# Patient Record
Sex: FEMALE | Race: WHITE | NOT HISPANIC OR LATINO | Employment: UNEMPLOYED | URBAN - METROPOLITAN AREA
[De-identification: names, ages, dates, MRNs, and addresses within clinical notes are randomized per-mention and may not be internally consistent; named-entity substitution may affect disease eponyms.]

---

## 2024-02-26 ENCOUNTER — HOSPITAL ENCOUNTER (EMERGENCY)
Facility: HOSPITAL | Age: 59
Discharge: HOME | End: 2024-02-27
Attending: STUDENT IN AN ORGANIZED HEALTH CARE EDUCATION/TRAINING PROGRAM | Admitting: STUDENT IN AN ORGANIZED HEALTH CARE EDUCATION/TRAINING PROGRAM
Payer: COMMERCIAL

## 2024-02-26 ENCOUNTER — APPOINTMENT (OUTPATIENT)
Dept: GENERAL RADIOLOGY | Facility: HOSPITAL | Age: 59
End: 2024-02-26
Payer: COMMERCIAL

## 2024-02-26 DIAGNOSIS — J18.9 MULTIFOCAL PNEUMONIA: Primary | ICD-10-CM

## 2024-02-26 DIAGNOSIS — R05.1 ACUTE COUGH: ICD-10-CM

## 2024-02-26 LAB
ALBUMIN SERPL-MCNC: 3.2 G/DL (ref 3.5–5.2)
ALBUMIN/GLOB SERPL: 1 G/DL
ALP SERPL-CCNC: 133 U/L (ref 39–117)
ALT SERPL W P-5'-P-CCNC: 34 U/L (ref 1–33)
ANION GAP SERPL CALCULATED.3IONS-SCNC: 11.4 MMOL/L (ref 5–15)
AST SERPL-CCNC: 47 U/L (ref 1–32)
BASOPHILS # BLD AUTO: 0.01 10*3/MM3 (ref 0–0.2)
BASOPHILS NFR BLD AUTO: 0.1 % (ref 0–1.5)
BILIRUB SERPL-MCNC: 0.3 MG/DL (ref 0–1.2)
BUN SERPL-MCNC: 11 MG/DL (ref 6–20)
BUN/CREAT SERPL: 11 (ref 7–25)
CALCIUM SPEC-SCNC: 8.8 MG/DL (ref 8.6–10.5)
CHLORIDE SERPL-SCNC: 92 MMOL/L (ref 98–107)
CO2 SERPL-SCNC: 27.6 MMOL/L (ref 22–29)
CREAT SERPL-MCNC: 1 MG/DL (ref 0.57–1)
DEPRECATED RDW RBC AUTO: 46.5 FL (ref 37–54)
EGFRCR SERPLBLD CKD-EPI 2021: 65.8 ML/MIN/1.73
EOSINOPHIL # BLD AUTO: 0.04 10*3/MM3 (ref 0–0.4)
EOSINOPHIL NFR BLD AUTO: 0.3 % (ref 0.3–6.2)
ERYTHROCYTE [DISTWIDTH] IN BLOOD BY AUTOMATED COUNT: 14.2 % (ref 12.3–15.4)
GLOBULIN UR ELPH-MCNC: 3.1 GM/DL
GLUCOSE SERPL-MCNC: 83 MG/DL (ref 65–99)
HCT VFR BLD AUTO: 30.6 % (ref 34–46.6)
HGB BLD-MCNC: 10 G/DL (ref 12–15.9)
IMM GRANULOCYTES # BLD AUTO: 0.3 10*3/MM3 (ref 0–0.05)
IMM GRANULOCYTES NFR BLD AUTO: 1.9 % (ref 0–0.5)
LYMPHOCYTES # BLD AUTO: 2.63 10*3/MM3 (ref 0.7–3.1)
LYMPHOCYTES NFR BLD AUTO: 16.9 % (ref 19.6–45.3)
MAGNESIUM SERPL-MCNC: 2 MG/DL (ref 1.6–2.6)
MCH RBC QN AUTO: 28.3 PG (ref 26.6–33)
MCHC RBC AUTO-ENTMCNC: 32.7 G/DL (ref 31.5–35.7)
MCV RBC AUTO: 86.7 FL (ref 79–97)
MONOCYTES # BLD AUTO: 0.82 10*3/MM3 (ref 0.1–0.9)
MONOCYTES NFR BLD AUTO: 5.3 % (ref 5–12)
NEUTROPHILS NFR BLD AUTO: 11.79 10*3/MM3 (ref 1.7–7)
NEUTROPHILS NFR BLD AUTO: 75.5 % (ref 42.7–76)
PLATELET # BLD AUTO: 407 10*3/MM3 (ref 140–450)
PMV BLD AUTO: 8.4 FL (ref 6–12)
POTASSIUM SERPL-SCNC: 2.8 MMOL/L (ref 3.5–5.2)
PROT SERPL-MCNC: 6.3 G/DL (ref 6–8.5)
RBC # BLD AUTO: 3.53 10*6/MM3 (ref 3.77–5.28)
SODIUM SERPL-SCNC: 131 MMOL/L (ref 136–145)
WBC NRBC COR # BLD AUTO: 15.59 10*3/MM3 (ref 3.4–10.8)

## 2024-02-26 PROCEDURE — 71046 X-RAY EXAM CHEST 2 VIEWS: CPT

## 2024-02-26 PROCEDURE — 25010000002 PIPERACILLIN SOD-TAZOBACTAM PER 1 G

## 2024-02-26 PROCEDURE — 85025 COMPLETE CBC W/AUTO DIFF WBC: CPT

## 2024-02-26 PROCEDURE — 83735 ASSAY OF MAGNESIUM: CPT | Performed by: STUDENT IN AN ORGANIZED HEALTH CARE EDUCATION/TRAINING PROGRAM

## 2024-02-26 PROCEDURE — 80053 COMPREHEN METABOLIC PANEL: CPT

## 2024-02-26 PROCEDURE — 96365 THER/PROPH/DIAG IV INF INIT: CPT

## 2024-02-26 PROCEDURE — 96375 TX/PRO/DX INJ NEW DRUG ADDON: CPT

## 2024-02-26 PROCEDURE — 99283 EMERGENCY DEPT VISIT LOW MDM: CPT

## 2024-02-26 PROCEDURE — 25010000002 METHYLPREDNISOLONE PER 125 MG

## 2024-02-26 RX ORDER — POTASSIUM CHLORIDE 750 MG/1
40 TABLET, FILM COATED, EXTENDED RELEASE ORAL ONCE
Status: COMPLETED | OUTPATIENT
Start: 2024-02-26 | End: 2024-02-27

## 2024-02-26 RX ORDER — DEXTROAMPHETAMINE SACCHARATE, AMPHETAMINE ASPARTATE, DEXTROAMPHETAMINE SULFATE AND AMPHETAMINE SULFATE 5; 5; 5; 5 MG/1; MG/1; MG/1; MG/1
20 TABLET ORAL DAILY
COMMUNITY

## 2024-02-26 RX ORDER — METHYLPREDNISOLONE SODIUM SUCCINATE 125 MG/2ML
125 INJECTION, POWDER, LYOPHILIZED, FOR SOLUTION INTRAMUSCULAR; INTRAVENOUS ONCE
Status: COMPLETED | OUTPATIENT
Start: 2024-02-26 | End: 2024-02-26

## 2024-02-26 RX ORDER — IPRATROPIUM BROMIDE AND ALBUTEROL SULFATE 2.5; .5 MG/3ML; MG/3ML
3 SOLUTION RESPIRATORY (INHALATION) ONCE
Status: COMPLETED | OUTPATIENT
Start: 2024-02-26 | End: 2024-02-26

## 2024-02-26 RX ORDER — BUPROPION HYDROCHLORIDE 150 MG/1
150 TABLET ORAL DAILY
COMMUNITY

## 2024-02-26 RX ORDER — SODIUM CHLORIDE 0.9 % (FLUSH) 0.9 %
10 SYRINGE (ML) INJECTION AS NEEDED
Status: DISCONTINUED | OUTPATIENT
Start: 2024-02-26 | End: 2024-02-27 | Stop reason: HOSPADM

## 2024-02-26 RX ORDER — LEVOTHYROXINE SODIUM 300 UG/1
300 TABLET ORAL DAILY
COMMUNITY

## 2024-02-26 RX ORDER — LOSARTAN POTASSIUM 100 MG/1
100 TABLET ORAL DAILY
COMMUNITY

## 2024-02-26 RX ADMIN — IPRATROPIUM BROMIDE AND ALBUTEROL SULFATE 3 ML: 2.5; .5 SOLUTION RESPIRATORY (INHALATION) at 22:56

## 2024-02-26 RX ADMIN — METHYLPREDNISOLONE SODIUM SUCCINATE 125 MG: 125 INJECTION, POWDER, FOR SOLUTION INTRAMUSCULAR; INTRAVENOUS at 23:01

## 2024-02-26 RX ADMIN — PIPERACILLIN AND TAZOBACTAM 3.38 G: 3; .375 INJECTION, POWDER, FOR SOLUTION INTRAVENOUS at 23:10

## 2024-02-27 VITALS
WEIGHT: 175 LBS | RESPIRATION RATE: 20 BRPM | TEMPERATURE: 98.6 F | BODY MASS INDEX: 28.12 KG/M2 | OXYGEN SATURATION: 94 % | HEART RATE: 73 BPM | DIASTOLIC BLOOD PRESSURE: 61 MMHG | HEIGHT: 66 IN | SYSTOLIC BLOOD PRESSURE: 101 MMHG

## 2024-02-27 RX ORDER — ALBUTEROL SULFATE 90 UG/1
2 AEROSOL, METERED RESPIRATORY (INHALATION) EVERY 4 HOURS PRN
Qty: 8 G | Refills: 0 | Status: SHIPPED | OUTPATIENT
Start: 2024-02-27

## 2024-02-27 RX ORDER — ONDANSETRON 4 MG/1
4 TABLET, ORALLY DISINTEGRATING ORAL EVERY 6 HOURS PRN
Qty: 20 TABLET | Refills: 0 | Status: SHIPPED | OUTPATIENT
Start: 2024-02-27

## 2024-02-27 RX ORDER — FLUCONAZOLE 150 MG/1
150 TABLET ORAL ONCE
Qty: 1 TABLET | Refills: 0 | Status: SHIPPED | OUTPATIENT
Start: 2024-02-27 | End: 2024-02-27

## 2024-02-27 RX ORDER — AMOXICILLIN AND CLAVULANATE POTASSIUM 875; 125 MG/1; MG/1
1 TABLET, FILM COATED ORAL 2 TIMES DAILY
Qty: 14 TABLET | Refills: 0 | Status: SHIPPED | OUTPATIENT
Start: 2024-02-27 | End: 2024-03-05

## 2024-02-27 RX ORDER — POTASSIUM CHLORIDE 750 MG/1
20 TABLET, FILM COATED, EXTENDED RELEASE ORAL DAILY
Qty: 6 TABLET | Refills: 0 | Status: SHIPPED | OUTPATIENT
Start: 2024-02-27 | End: 2024-03-01

## 2024-02-27 RX ADMIN — POTASSIUM CHLORIDE 40 MEQ: 750 TABLET, EXTENDED RELEASE ORAL at 00:10

## 2024-02-27 NOTE — FSED PROVIDER NOTE
Subjective   History of Present Illness  Patient is a 58-year-old female with a history of HTN, hypothyroidism, depression, prediabetes who presents to the emergency department for cough x 1 week.  Associated congestion, fatigue, fever, very poor oral intake, lightheadedness reports fever of 100.1F.  Patient was seen by PCP on Friday, diagnosed with bronchitis, and prescribed azithromycin.  Reports is not helping.  Current everyday smoker.  No history of asthma, COPD, CHF.        Review of Systems   Constitutional:  Positive for appetite change, fatigue and fever. Negative for chills and diaphoresis.   HENT:  Positive for congestion, postnasal drip and rhinorrhea. Negative for sinus pressure, sinus pain, sore throat and trouble swallowing.    Eyes:  Negative for pain and redness.   Respiratory:  Positive for cough. Negative for shortness of breath.    Cardiovascular:  Negative for chest pain.   Gastrointestinal:  Negative for abdominal pain, constipation, diarrhea, nausea and vomiting.   Genitourinary:  Negative for difficulty urinating and dysuria.   Skin:  Negative for color change, pallor, rash and wound.   Neurological:  Positive for weakness and light-headedness. Negative for tremors, seizures, syncope, speech difficulty and headaches.       Past Medical History:   Diagnosis Date    Depression     Disease of thyroid gland     Hypertension        No Known Allergies    Past Surgical History:   Procedure Laterality Date    GASTRECTOMY      HYSTERECTOMY         History reviewed. No pertinent family history.    Social History     Socioeconomic History    Marital status:            Objective   Physical Exam  Constitutional:       General: She is not in acute distress.     Appearance: She is normal weight. She is not ill-appearing, toxic-appearing or diaphoretic.   HENT:      Right Ear: Ear canal and external ear normal.      Left Ear: Tympanic membrane, ear canal and external ear normal.      Ears:       Comments: Right TM mildly erythematous.  Eyes:      Extraocular Movements: Extraocular movements intact.      Conjunctiva/sclera: Conjunctivae normal.      Pupils: Pupils are equal, round, and reactive to light.   Cardiovascular:      Rate and Rhythm: Normal rate and regular rhythm.   Pulmonary:      Effort: Pulmonary effort is normal. No respiratory distress.      Breath sounds: Normal breath sounds.      Comments: Occasional cough.  Wheezing crackles bilateral lower lung bases, left worse than right.  No respiratory distress.  Skin:     General: Skin is warm and dry.   Neurological:      General: No focal deficit present.      Mental Status: She is alert and oriented to person, place, and time.   Psychiatric:         Mood and Affect: Mood normal.         Behavior: Behavior normal.         Procedures           ED Course  ED Course as of 02/27/24 2156   Mon Feb 26, 2024   2255 Chest x-ray PA and lateral:  IMPRESSION:  1. There is multifocal patchy and nodular airspace consolidation  involving the lingular segment of the left upper lobe and the right  middle lobe and both lung bases compatible with multifocal pneumonia. A  CT of the chest could be obtained for a more comprehensive assessment. I  recommend follow-up chest x-ray to resolution. Results were communicated  to Dr. Bella from [facility] by telephone on 2/26/2024 at  10:40 p.m [AS]   2324 WBC 15.59.  And potassium 2.8.  Patient received IV antibiotics in the ED.  Patient has no O2 requirement.  She has 1 of 4 SIRS criteria.  Patient has had decreased p.o. intake which is likely contributing to hypokalemia.  Replacement was conducted in the ED. [AN]   Tue Feb 27, 2024   0028 Patient feels significantly improved after medication in the ED.  Walking SpO2 maintained 92% and greater.  Will add Augmentin on top of her azithromycin.  She is also been prescribed an albuterol inhaler, oral potassium x 3 days and as needed Zofran.  Strict return  precautions were given.  She will see her primary care provider in 2 days.  If she has worsening symptoms before then, or is at all otherwise concerned she will return to the ED sooner.    Electronically signed by Roberto Bella DO, 02/27/24, 12:29 AM EST.   [AN]      ED Course User Index  [AN] Roberto Bella DO  [AS] Deborah Humphries, CRISTA                                           Medical Decision Making  Patient is a 58-year-old female who presents with cough.  Overall appears mildly ill, but in no acute distress, nontoxic.  Vital signs are WNL.  There is some wheezing and crackles in the lower lung bases.  Nebulizer and lab work ordered.    My differential diagnosis for cough includes but is not limited to:  Upper respiratory infection, bronchitis, pneumonia, COPD exacerbation, cough variant asthma, cardiac asthma, coronary artery disease, congestive heart failure, bacterial, viral or lung infections, lung cancer, aspiration pneumonitis, aspiration of foreign body and Covid -19           Problems Addressed:  Acute cough: complicated acute illness or injury  Multifocal pneumonia: complicated acute illness or injury    Amount and/or Complexity of Data Reviewed  Labs: ordered.  Radiology: ordered.    Risk  Prescription drug management.        Final diagnoses:   Acute cough   Multifocal pneumonia       ED Disposition  ED Disposition       ED Disposition   Discharge    Condition   Stable    Comment   --               Feli Cottrell MD  [address]  [place] [address]  [phone]    Schedule an appointment as soon as possible for a visit   If symptoms worsen    Feli Cottrell MD  [address]  [place] [address]  [phone]    Schedule an appointment as soon as possible for a visit in 2 days           Medication List        New Prescriptions      albuterol sulfate  (90 Base) MCG/ACT inhaler  Commonly known as: PROVENTIL HFA;VENTOLIN HFA;PROAIR HFA  Inhale 2 puffs Every 4 (Four)  Hours As Needed for Wheezing.     amoxicillin-clavulanate 875-125 MG per tablet  Commonly known as: AUGMENTIN  Take 1 tablet by mouth 2 (Two) Times a Day for 7 days.     fluconazole 150 MG tablet  Commonly known as: DIFLUCAN  Take 1 tablet by mouth 1 (One) Time for 1 dose.     ondansetron ODT 4 MG disintegrating tablet  Commonly known as: ZOFRAN-ODT  Place 1 tablet on the tongue Every 6 (Six) Hours As Needed for Nausea or Vomiting.     potassium chloride 10 MEQ CR tablet  Take 2 tablets by mouth Daily for 3 days.               Where to Get Your Medications        These medications were sent to [id number] - [place], [place] - [address] [address] AT [facility] [address] - [phone] [facility] [phone]   [id number] [place] [id number]      Phone: [phone]   albuterol sulfate  (90 Base) MCG/ACT inhaler  amoxicillin-clavulanate 875-125 MG per tablet  fluconazole 150 MG tablet  ondansetron ODT 4 MG disintegrating tablet  potassium chloride 10 MEQ CR tablet

## 2024-02-27 NOTE — DISCHARGE INSTRUCTIONS
Follow-up with your primary care provider for recheck in 2 days.  If your symptoms worsen or new symptoms develop before then please return to ED sooner.  Otherwise continue taking your azithromycin and Augmentin.  You have been prescribed potassium because your potassium level was low in the emergency department.  We gave you oral replacement while in the ER but you should continue taking it for few days.  Zofran has been prescribed to help you with nausea.

## 2024-02-27 NOTE — ED NOTES
Pt was seen Friday and given antibx, feel no better, has dry cough and feels like there is fluid in her lungs, feels exhausted

## 2024-08-29 ENCOUNTER — APPOINTMENT (OUTPATIENT)
Dept: GENERAL RADIOLOGY | Facility: HOSPITAL | Age: 59
End: 2024-08-29
Payer: COMMERCIAL

## 2024-08-29 ENCOUNTER — APPOINTMENT (OUTPATIENT)
Dept: CT IMAGING | Facility: HOSPITAL | Age: 59
End: 2024-08-29
Payer: COMMERCIAL

## 2024-08-29 ENCOUNTER — HOSPITAL ENCOUNTER (EMERGENCY)
Facility: HOSPITAL | Age: 59
Discharge: HOME | End: 2024-08-30
Attending: EMERGENCY MEDICINE
Payer: COMMERCIAL

## 2024-08-29 VITALS
BODY MASS INDEX: 27.47 KG/M2 | DIASTOLIC BLOOD PRESSURE: 87 MMHG | TEMPERATURE: 97.1 F | WEIGHT: 175 LBS | SYSTOLIC BLOOD PRESSURE: 148 MMHG | RESPIRATION RATE: 18 BRPM | OXYGEN SATURATION: 97 % | HEART RATE: 88 BPM | HEIGHT: 67 IN

## 2024-08-29 DIAGNOSIS — R51.9 ACUTE NONINTRACTABLE HEADACHE, UNSPECIFIED HEADACHE TYPE: Primary | ICD-10-CM

## 2024-08-29 DIAGNOSIS — I10 HYPERTENSION NOT AT GOAL: ICD-10-CM

## 2024-08-29 DIAGNOSIS — R00.2 PALPITATIONS: ICD-10-CM

## 2024-08-29 LAB
ALBUMIN SERPL-MCNC: 4.3 G/DL (ref 3.5–5.2)
ALBUMIN/GLOB SERPL: 1.7 G/DL
ALP SERPL-CCNC: 104 U/L (ref 39–117)
ALT SERPL W P-5'-P-CCNC: 20 U/L (ref 1–33)
ANION GAP SERPL CALCULATED.3IONS-SCNC: 9.7 MMOL/L (ref 5–15)
APTT PPP: 25.3 SECONDS (ref 22.7–35.4)
AST SERPL-CCNC: 18 U/L (ref 1–32)
BASOPHILS # BLD AUTO: 0.02 10*3/MM3 (ref 0–0.2)
BASOPHILS NFR BLD AUTO: 0.2 % (ref 0–1.5)
BILIRUB SERPL-MCNC: <0.2 MG/DL (ref 0–1.2)
BUN SERPL-MCNC: 20 MG/DL (ref 6–20)
BUN/CREAT SERPL: 24.1 (ref 7–25)
CALCIUM SPEC-SCNC: 8.8 MG/DL (ref 8.6–10.5)
CHLORIDE SERPL-SCNC: 108 MMOL/L (ref 98–107)
CO2 SERPL-SCNC: 20.3 MMOL/L (ref 22–29)
CREAT SERPL-MCNC: 0.83 MG/DL (ref 0.57–1)
DEPRECATED RDW RBC AUTO: 51.6 FL (ref 37–54)
EGFRCR SERPLBLD CKD-EPI 2021: 81.8 ML/MIN/1.73
EOSINOPHIL # BLD AUTO: 0.01 10*3/MM3 (ref 0–0.4)
EOSINOPHIL NFR BLD AUTO: 0.1 % (ref 0.3–6.2)
ERYTHROCYTE [DISTWIDTH] IN BLOOD BY AUTOMATED COUNT: 16.3 % (ref 12.3–15.4)
GLOBULIN UR ELPH-MCNC: 2.6 GM/DL
GLUCOSE SERPL-MCNC: 152 MG/DL (ref 65–99)
HCT VFR BLD AUTO: 31.9 % (ref 34–46.6)
HGB BLD-MCNC: 10.7 G/DL (ref 12–15.9)
HOLD SPECIMEN: NORMAL
HOLD SPECIMEN: NORMAL
IMM GRANULOCYTES # BLD AUTO: 0.05 10*3/MM3 (ref 0–0.05)
IMM GRANULOCYTES NFR BLD AUTO: 0.5 % (ref 0–0.5)
INR PPP: 0.91 (ref 0.9–1.1)
LYMPHOCYTES # BLD AUTO: 2.1 10*3/MM3 (ref 0.7–3.1)
LYMPHOCYTES NFR BLD AUTO: 22.2 % (ref 19.6–45.3)
MAGNESIUM SERPL-MCNC: 2.2 MG/DL (ref 1.6–2.6)
MCH RBC QN AUTO: 29.4 PG (ref 26.6–33)
MCHC RBC AUTO-ENTMCNC: 33.5 G/DL (ref 31.5–35.7)
MCV RBC AUTO: 87.6 FL (ref 79–97)
MONOCYTES # BLD AUTO: 0.48 10*3/MM3 (ref 0.1–0.9)
MONOCYTES NFR BLD AUTO: 5.1 % (ref 5–12)
NEUTROPHILS NFR BLD AUTO: 6.81 10*3/MM3 (ref 1.7–7)
NEUTROPHILS NFR BLD AUTO: 71.9 % (ref 42.7–76)
NRBC BLD AUTO-RTO: 0 /100 WBC (ref 0–0.2)
PLATELET # BLD AUTO: 231 10*3/MM3 (ref 140–450)
PMV BLD AUTO: 9 FL (ref 6–12)
POTASSIUM SERPL-SCNC: 3.2 MMOL/L (ref 3.5–5.2)
PROT SERPL-MCNC: 6.9 G/DL (ref 6–8.5)
PROTHROMBIN TIME: 12.4 SECONDS (ref 11.7–14.2)
RBC # BLD AUTO: 3.64 10*6/MM3 (ref 3.77–5.28)
SODIUM SERPL-SCNC: 138 MMOL/L (ref 136–145)
T4 FREE SERPL-MCNC: 1.3 NG/DL (ref 0.92–1.68)
TROPONIN T SERPL HS-MCNC: 10 NG/L
TSH SERPL DL<=0.05 MIU/L-ACNC: 9.16 UIU/ML (ref 0.27–4.2)
WBC NRBC COR # BLD AUTO: 9.47 10*3/MM3 (ref 3.4–10.8)
WHOLE BLOOD HOLD COAG: NORMAL
WHOLE BLOOD HOLD SPECIMEN: NORMAL

## 2024-08-29 PROCEDURE — 85610 PROTHROMBIN TIME: CPT | Performed by: PHYSICIAN ASSISTANT

## 2024-08-29 PROCEDURE — 36415 COLL VENOUS BLD VENIPUNCTURE: CPT

## 2024-08-29 PROCEDURE — 93005 ELECTROCARDIOGRAM TRACING: CPT | Performed by: EMERGENCY MEDICINE

## 2024-08-29 PROCEDURE — 83735 ASSAY OF MAGNESIUM: CPT | Performed by: PHYSICIAN ASSISTANT

## 2024-08-29 PROCEDURE — 84439 ASSAY OF FREE THYROXINE: CPT | Performed by: PHYSICIAN ASSISTANT

## 2024-08-29 PROCEDURE — 99284 EMERGENCY DEPT VISIT MOD MDM: CPT

## 2024-08-29 PROCEDURE — 93010 ELECTROCARDIOGRAM REPORT: CPT | Performed by: INTERNAL MEDICINE

## 2024-08-29 PROCEDURE — 70450 CT HEAD/BRAIN W/O DYE: CPT

## 2024-08-29 PROCEDURE — 0202U NFCT DS 22 TRGT SARS-COV-2: CPT | Performed by: PHYSICIAN ASSISTANT

## 2024-08-29 PROCEDURE — 93005 ELECTROCARDIOGRAM TRACING: CPT

## 2024-08-29 PROCEDURE — 84484 ASSAY OF TROPONIN QUANT: CPT | Performed by: EMERGENCY MEDICINE

## 2024-08-29 PROCEDURE — 85730 THROMBOPLASTIN TIME PARTIAL: CPT | Performed by: PHYSICIAN ASSISTANT

## 2024-08-29 PROCEDURE — 71045 X-RAY EXAM CHEST 1 VIEW: CPT

## 2024-08-29 PROCEDURE — 80050 GENERAL HEALTH PANEL: CPT | Performed by: EMERGENCY MEDICINE

## 2024-08-29 RX ORDER — SODIUM CHLORIDE 0.9 % (FLUSH) 0.9 %
10 SYRINGE (ML) INJECTION AS NEEDED
Status: DISCONTINUED | OUTPATIENT
Start: 2024-08-29 | End: 2024-08-30 | Stop reason: HOSPADM

## 2024-08-29 RX ORDER — ASPIRIN 325 MG
325 TABLET ORAL ONCE
Status: DISCONTINUED | OUTPATIENT
Start: 2024-08-29 | End: 2024-08-30 | Stop reason: HOSPADM

## 2024-08-30 LAB
B PARAPERT DNA SPEC QL NAA+PROBE: NOT DETECTED
B PERT DNA SPEC QL NAA+PROBE: NOT DETECTED
C PNEUM DNA NPH QL NAA+NON-PROBE: NOT DETECTED
FLUAV SUBTYP SPEC NAA+PROBE: NOT DETECTED
FLUBV RNA ISLT QL NAA+PROBE: NOT DETECTED
GEN 5 2HR TROPONIN T REFLEX: 12 NG/L
HADV DNA SPEC NAA+PROBE: NOT DETECTED
HCOV 229E RNA SPEC QL NAA+PROBE: NOT DETECTED
HCOV HKU1 RNA SPEC QL NAA+PROBE: NOT DETECTED
HCOV NL63 RNA SPEC QL NAA+PROBE: NOT DETECTED
HCOV OC43 RNA SPEC QL NAA+PROBE: NOT DETECTED
HMPV RNA NPH QL NAA+NON-PROBE: NOT DETECTED
HPIV1 RNA ISLT QL NAA+PROBE: NOT DETECTED
HPIV2 RNA SPEC QL NAA+PROBE: NOT DETECTED
HPIV3 RNA NPH QL NAA+PROBE: NOT DETECTED
HPIV4 P GENE NPH QL NAA+PROBE: NOT DETECTED
M PNEUMO IGG SER IA-ACNC: NOT DETECTED
QT INTERVAL: 362 MS
QTC INTERVAL: 460 MS
RHINOVIRUS RNA SPEC NAA+PROBE: NOT DETECTED
RSV RNA NPH QL NAA+NON-PROBE: NOT DETECTED
SARS-COV-2 RNA NPH QL NAA+NON-PROBE: NOT DETECTED
TROPONIN T DELTA: 2 NG/L

## 2024-08-30 PROCEDURE — 84484 ASSAY OF TROPONIN QUANT: CPT | Performed by: EMERGENCY MEDICINE

## 2024-08-30 RX ORDER — ACETAMINOPHEN 500 MG
1000 TABLET ORAL ONCE
Status: COMPLETED | OUTPATIENT
Start: 2024-08-30 | End: 2024-08-30

## 2024-08-30 RX ADMIN — ACETAMINOPHEN 1000 MG: 500 TABLET ORAL at 00:19

## 2024-08-30 NOTE — ED PROVIDER NOTES
MD ATTESTATION NOTE    SHARED VISIT: This visit was performed by BOTH a physician and an APC. The substantive portion of the medical decision making was performed by this attesting physician who made or approved the management plan and takes responsibility for patient management. All studies documented in the APC note (if performed) were independently interpreted by me.    The JOEY and I have discussed this patient's history, physical exam, MDM, and treatment plan.  I have reviewed the documentation and personally had a face to face interaction with the patient. The attached note describes my personal findings.      Rachael Billings is a 59 y.o. female who presents to the ED c/o acute headache and feeling like her blood pressure been running high.  States she been having headaches and palpitations for the past couple days.  States that this evening around 9 or 10 PM she developed a frontal headache which was more severe than the prior ones.  Is not the worst headache of her life did come on gradually.  Patient checked her blood pressure it was in the 180s systolic.  Patient denies any chest pain no shortness of breath no abdominal pain no nausea vomiting.  The headache has improved since coming to the ED.  Also states her blood pressure has been improving.    On exam:  GENERAL: not distressed  HENT: nares patent  EYES: no scleral icterus  CV: regular rhythm, regular rate  RESPIRATORY: normal effort  ABDOMEN: soft  MUSCULOSKELETAL: no deformity  NEURO: alert, moves all extremities, follows commands  SKIN: warm, dry    Labs  Recent Results (from the past 24 hour(s))   ECG 12 Lead ED Triage Standing Order; Chest Pain    Collection Time: 08/29/24 10:22 PM   Result Value Ref Range    QT Interval 362 ms    QTC Interval 460 ms   Comprehensive Metabolic Panel    Collection Time: 08/29/24 10:44 PM    Specimen: Blood   Result Value Ref Range    Glucose 152 (H) 65 - 99 mg/dL    BUN 20 6 - 20 mg/dL    Creatinine 0.83 0.57 - 1.00  mg/dL    Sodium 138 136 - 145 mmol/L    Potassium 3.2 (L) 3.5 - 5.2 mmol/L    Chloride 108 (H) 98 - 107 mmol/L    CO2 20.3 (L) 22.0 - 29.0 mmol/L    Calcium 8.8 8.6 - 10.5 mg/dL    Total Protein 6.9 6.0 - 8.5 g/dL    Albumin 4.3 3.5 - 5.2 g/dL    ALT (SGPT) 20 1 - 33 U/L    AST (SGOT) 18 1 - 32 U/L    Alkaline Phosphatase 104 39 - 117 U/L    Total Bilirubin <0.2 0.0 - 1.2 mg/dL    Globulin 2.6 gm/dL    A/G Ratio 1.7 g/dL    BUN/Creatinine Ratio 24.1 7.0 - 25.0    Anion Gap 9.7 5.0 - 15.0 mmol/L    eGFR 81.8 >60.0 mL/min/1.73   High Sensitivity Troponin T    Collection Time: 08/29/24 10:44 PM    Specimen: Blood   Result Value Ref Range    HS Troponin T 10 <14 ng/L   Green Top (Gel)    Collection Time: 08/29/24 10:44 PM   Result Value Ref Range    Extra Tube Hold for add-ons.    Lavender Top    Collection Time: 08/29/24 10:44 PM   Result Value Ref Range    Extra Tube hold for add-on    Gold Top - SST    Collection Time: 08/29/24 10:44 PM   Result Value Ref Range    Extra Tube Hold for add-ons.    Light Blue Top    Collection Time: 08/29/24 10:44 PM   Result Value Ref Range    Extra Tube Hold for add-ons.    CBC Auto Differential    Collection Time: 08/29/24 10:44 PM    Specimen: Blood   Result Value Ref Range    WBC 9.47 3.40 - 10.80 10*3/mm3    RBC 3.64 (L) 3.77 - 5.28 10*6/mm3    Hemoglobin 10.7 (L) 12.0 - 15.9 g/dL    Hematocrit 31.9 (L) 34.0 - 46.6 %    MCV 87.6 79.0 - 97.0 fL    MCH 29.4 26.6 - 33.0 pg    MCHC 33.5 31.5 - 35.7 g/dL    RDW 16.3 (H) 12.3 - 15.4 %    RDW-SD 51.6 37.0 - 54.0 fl    MPV 9.0 6.0 - 12.0 fL    Platelets 231 140 - 450 10*3/mm3    Neutrophil % 71.9 42.7 - 76.0 %    Lymphocyte % 22.2 19.6 - 45.3 %    Monocyte % 5.1 5.0 - 12.0 %    Eosinophil % 0.1 (L) 0.3 - 6.2 %    Basophil % 0.2 0.0 - 1.5 %    Immature Grans % 0.5 0.0 - 0.5 %    Neutrophils, Absolute 6.81 1.70 - 7.00 10*3/mm3    Lymphocytes, Absolute 2.10 0.70 - 3.10 10*3/mm3    Monocytes, Absolute 0.48 0.10 - 0.90 10*3/mm3    Eosinophils,  Absolute 0.01 0.00 - 0.40 10*3/mm3    Basophils, Absolute 0.02 0.00 - 0.20 10*3/mm3    Immature Grans, Absolute 0.05 0.00 - 0.05 10*3/mm3    nRBC 0.0 0.0 - 0.2 /100 WBC   Magnesium    Collection Time: 08/29/24 10:44 PM    Specimen: Blood   Result Value Ref Range    Magnesium 2.2 1.6 - 2.6 mg/dL   Protime-INR    Collection Time: 08/29/24 10:44 PM    Specimen: Blood   Result Value Ref Range    Protime 12.4 11.7 - 14.2 Seconds    INR 0.91 0.90 - 1.10   aPTT    Collection Time: 08/29/24 10:44 PM    Specimen: Blood   Result Value Ref Range    PTT 25.3 22.7 - 35.4 seconds   TSH    Collection Time: 08/29/24 10:44 PM    Specimen: Blood   Result Value Ref Range    TSH 9.160 (H) 0.270 - 4.200 uIU/mL   T4, Free    Collection Time: 08/29/24 10:44 PM    Specimen: Blood   Result Value Ref Range    Free T4 1.30 0.92 - 1.68 ng/dL   Respiratory Panel PCR w/COVID-19(SARS-CoV-2) RAMÓN/SUKUMAR/ERICKA/PAD/COR/BRDAEN In-House, NP Swab in UTM/VTM, 2 HR TAT - Swab, Nasopharynx    Collection Time: 08/29/24 11:04 PM    Specimen: Nasopharynx; Swab   Result Value Ref Range    ADENOVIRUS, PCR Not Detected Not Detected    Coronavirus 229E Not Detected Not Detected    Coronavirus HKU1 Not Detected Not Detected    Coronavirus NL63 Not Detected Not Detected    Coronavirus OC43 Not Detected Not Detected    COVID19 Not Detected Not Detected - Ref. Range    Human Metapneumovirus Not Detected Not Detected    Human Rhinovirus/Enterovirus Not Detected Not Detected    Influenza A PCR Not Detected Not Detected    Influenza B PCR Not Detected Not Detected    Parainfluenza Virus 1 Not Detected Not Detected    Parainfluenza Virus 2 Not Detected Not Detected    Parainfluenza Virus 3 Not Detected Not Detected    Parainfluenza Virus 4 Not Detected Not Detected    RSV, PCR Not Detected Not Detected    Bordetella pertussis pcr Not Detected Not Detected    Bordetella parapertussis PCR Not Detected Not Detected    Chlamydophila pneumoniae PCR Not Detected Not Detected     Mycoplasma pneumo by PCR Not Detected Not Detected   High Sensitivity Troponin T 2Hr    Collection Time: 08/30/24 12:32 AM    Specimen: Blood   Result Value Ref Range    HS Troponin T 12 <14 ng/L    Troponin T Delta 2 >=-4 - <+4 ng/L       Radiology  CT Head Without Contrast    Result Date: 8/30/2024  Patient: CHERYL SINGH  Time Out: 00:03 Exam(s): CT HEAD Without Contrast EXAM:   CT Head Without Intravenous Contrast CLINICAL HISTORY:      Headache. TECHNIQUE:   Axial computed tomography images of the head brain without intravenous contrast.  CTDI is 56.23 mGy and DLP is 967.6 mGy-cm.  This CT exam was performed according to the principle of ALARA (As Low As Reasonably Achievable) by using one or more of the following dose reduction techniques: automated exposure control, adjustment of the mA and or kV according to patient size, and or use of iterative reconstruction technique. COMPARISON:   No relevant prior studies available. FINDINGS:   Brain:  No intracranial hemorrhage, mass-effect or midline shift.  No abnormal extra axial fluid.  No evidence of acute infarct.  Mild periventricular white matter hypodensities are most consistent with chronic microangiopathy.   Ventricles:  Unremarkable.  No ventriculomegaly.   Bones joints:  Unremarkable.  No acute fracture.   Soft tissues:  Unremarkable.   Sinuses:  Unremarkable as visualized.  No acute sinusitis.   Mastoid air cells:  Unremarkable as visualized.  No mastoid effusion. IMPRESSION:       No acute intracranial finding.     Electronically signed by Veena Mosley MD on 08-30-24 at 0003    XR Chest 1 View    Result Date: 8/29/2024  Patient: CHERYL SINGH  Time Out: 23:49 Exam(s): XR CXR 1 VIEW EXAM:   XR Chest, 1 View CLINICAL HISTORY:      Chest Pain. TECHNIQUE:   Frontal view of the chest. COMPARISON:   Chest radiograph 2 26 2024 FINDINGS:   Lungs:  Unremarkable.  No consolidation.   Pleural space:  Unremarkable.  No pneumothorax.   Heart:  Unremarkable.   No cardiomegaly.   Mediastinum:  Unremarkable.  Normal mediastinal contour.   Bones joints:  There are degenerative changes of the spine.  No acute fracture. IMPRESSION:       No acute findings in the chest.     Electronically signed by Veena Mosley MD on 08-29-24 at 2349     Medications given in the ED:  Medications   acetaminophen (TYLENOL) tablet 1,000 mg (1,000 mg Oral Given 8/30/24 0019)       Orders placed during this visit:  Orders Placed This Encounter   Procedures    Respiratory Panel PCR w/COVID-19(SARS-CoV-2) RAMÓN/SUKUMAR/ERICKA/PAD/COR/BRADEN In-House, NP Swab in UTM/VTM, 2 HR TAT - Swab, Nasopharynx    XR Chest 1 View    CT Head Without Contrast    [place] Draw    Comprehensive Metabolic Panel    High Sensitivity Troponin T    CBC Auto Differential    Magnesium    Protime-INR    aPTT    TSH    T4, Free    High Sensitivity Troponin T 2Hr    Undress & Gown    Continuous Pulse Oximetry    ECG 12 Lead ED Triage Standing Order; Chest Pain    CBC & Differential    Green Top (Gel)    Lavender Top    Gold Top - SST    Light Blue Top       Medical Decision Making:  ED Course as of 08/30/24 0604   Thu Aug 29, 2024   2253 I discussed the case with Dr. Luis and he agrees to evaluate the patient at the bedside.    [CC]   2334 TSH Baseline(!): 9.160 [CC]   2334 XR Chest 1 View  My independent interpretation of the chest x-ray is no acute infiltrate [CC]   2334 CT Head Without Contrast  My independent interpretation of the CT of the head is no intracranial hemorrhage [CC]   2334 Free T4: 1.30 [CC]   Fri Aug 30, 2024   0106 Troponin T Delta: 2 [CC]   0108 I rechecked the patient.  I discussed the patient's labs, radiology findings (including all incidental findings), diagnosis, and plan for discharge.  A repeat exam reveals no new worrisome changes from my initial exam findings.  The patient understands that the fact that they are being discharged does not denote that nothing is abnormal, it indicates that no clinical  emergency is present and that they must follow-up as directed in order to properly maintain their health.  Follow-up instructions (specifically listed below) and return to ER precautions were given at this time.  I specifically instructed the patient to follow-up with their PCP.  The patient understands and agrees with the plan, and is ready for discharge.  All questions answered.   [CC]      ED Course User Index  [CC] Roberta Zelaya, CRISTA       Differential diagnosis:  My differential diagnosis for headache includes but is not limited to:  Migraine, cluster, ischemic stroke, subarachnoid hemorrhage, intracranial hemorrhage, vascular malformation, cerebral aneurysm, vascular dissection, vasculitis, temporal arteritis, malignant hypertension, pheochromocytoma, cerebral venous thrombosis, preeclampsia; bacterial meningitis, viral meningitis, fungal meningitis, encephalitis, brain abscess, pleural empyema, sinusitis, dental infection, influenza, viral syndrome; carbon monoxide exposure, analgesic abuse, hypoglycemia; trigeminal neuralgia, postherpetic neuralgia, occipital neuralgia; subdural hematoma, concussion, musculoskeletal tension, cervical osteoarthritis; glaucoma, TMJ disease, pseudotumor cerebri, post LP headache, intracranial neoplasm, sleep apnea\    Diagnosis  Final diagnoses:   Acute nonintractable headache, unspecified headache type   Palpitations   Hypertension not at goal          Uriel Luis MD  08/30/24 0604

## 2024-08-30 NOTE — ED PROVIDER NOTES
" EMERGENCY DEPARTMENT ENCOUNTER  Room Number:  08/08  PCP: Feli Cottrell MD  Independent Historians: Patient      HPI:  Chief Complaint: had concerns including Headache and Hypertension.     A complete HPI/ROS/PMH/PSH/SH/FH are unobtainable due to: None    Chronic or social conditions impacting patient care (Social Determinants of Health): None      Context: Rachael Billings is a 59 y.o. female with a medical history of hypertension, hypothyroidism, and depression presents emergency department complaining that she generally has been feeling unwell for the last few days but worsened today.  She reports that she has had palpitations and \"fluttering\" in her chest.  She also reports episodes of shortness of breath, and feeling flushed.  She reports she is felt lightheaded at times and has had a headache.  She denies that this is the worst headache of her life and describes it as throbbing and holoacranial in nature.  She denies any neck pain or stiffness.  She denies fever or chills.  She denies body aches.  She did recently have an adjustment to her thyroid medication.  She takes losartan for her blood pressure and says her blood pressure has been higher than normal since the symptoms started.  She denies a cardiac history and has never had a stress test.      Review of prior external notes (non-ED) -and- Review of prior external test results outside of this encounter:   Patient saw the orthopedist in the office yesterday and had a cortisone injection for chronic right shoulder pain    I reviewed labs from 8/15/2024, TSH 58, free T4 0.56      PAST MEDICAL HISTORY  Active Ambulatory Problems     Diagnosis Date Noted    No Active Ambulatory Problems     Resolved Ambulatory Problems     Diagnosis Date Noted    No Resolved Ambulatory Problems     Past Medical History:   Diagnosis Date    Depression     Disease of thyroid gland     Hypertension          PAST SURGICAL HISTORY  Past Surgical History:   Procedure Laterality Date "    GASTRECTOMY      HYSTERECTOMY           FAMILY HISTORY  History reviewed. No pertinent family history.      SOCIAL HISTORY  Social History     Socioeconomic History    Marital status:    Tobacco Use    Smoking status: Every Day     Current packs/day: 0.50     Types: Cigarettes         ALLERGIES  Patient has no known allergies.      REVIEW OF SYSTEMS  Included in HPI  All systems reviewed and negative except for those discussed in HPI.      PHYSICAL EXAM    I have reviewed the triage vital signs and nursing notes.    ED Triage Vitals   Temp Heart Rate Resp BP SpO2   08/29/24 2219 08/29/24 2219 08/29/24 2219 08/29/24 2224 08/29/24 2219   97.1 °F (36.2 °C) 105 18 163/92 98 %      Temp src Heart Rate Source Patient Position BP Location FiO2 (%)   08/29/24 2219 08/29/24 2219 08/29/24 2224 08/29/24 2224 --   Tympanic Monitor Sitting Left arm        Physical Exam  Constitutional:       General: She is not in acute distress.     Appearance: Normal appearance. She is obese.   HENT:      Head: Normocephalic and atraumatic.      Nose: Nose normal.      Mouth/Throat:      Mouth: Mucous membranes are moist.   Eyes:      Extraocular Movements: Extraocular movements intact.      Pupils: Pupils are equal, round, and reactive to light.   Cardiovascular:      Rate and Rhythm: Normal rate and regular rhythm.      Pulses: Normal pulses.      Heart sounds: Normal heart sounds.   Pulmonary:      Effort: Pulmonary effort is normal. No respiratory distress.      Breath sounds: Normal breath sounds.   Abdominal:      General: Abdomen is flat. There is no distension.      Palpations: Abdomen is soft.      Tenderness: There is no abdominal tenderness.   Musculoskeletal:         General: Normal range of motion.      Cervical back: Normal range of motion and neck supple.   Skin:     General: Skin is warm and dry.      Capillary Refill: Capillary refill takes less than 2 seconds.   Neurological:      General: No focal deficit present.       Mental Status: She is alert and oriented to person, place, and time.   Psychiatric:         Mood and Affect: Mood normal.         Behavior: Behavior normal.             LAB RESULTS  Recent Results (from the past 24 hour(s))   ECG 12 Lead ED Triage Standing Order; Chest Pain    Collection Time: 08/29/24 10:22 PM   Result Value Ref Range    QT Interval 362 ms    QTC Interval 460 ms   Comprehensive Metabolic Panel    Collection Time: 08/29/24 10:44 PM    Specimen: Blood   Result Value Ref Range    Glucose 152 (H) 65 - 99 mg/dL    BUN 20 6 - 20 mg/dL    Creatinine 0.83 0.57 - 1.00 mg/dL    Sodium 138 136 - 145 mmol/L    Potassium 3.2 (L) 3.5 - 5.2 mmol/L    Chloride 108 (H) 98 - 107 mmol/L    CO2 20.3 (L) 22.0 - 29.0 mmol/L    Calcium 8.8 8.6 - 10.5 mg/dL    Total Protein 6.9 6.0 - 8.5 g/dL    Albumin 4.3 3.5 - 5.2 g/dL    ALT (SGPT) 20 1 - 33 U/L    AST (SGOT) 18 1 - 32 U/L    Alkaline Phosphatase 104 39 - 117 U/L    Total Bilirubin <0.2 0.0 - 1.2 mg/dL    Globulin 2.6 gm/dL    A/G Ratio 1.7 g/dL    BUN/Creatinine Ratio 24.1 7.0 - 25.0    Anion Gap 9.7 5.0 - 15.0 mmol/L    eGFR 81.8 >60.0 mL/min/1.73   High Sensitivity Troponin T    Collection Time: 08/29/24 10:44 PM    Specimen: Blood   Result Value Ref Range    HS Troponin T 10 <14 ng/L   Green Top (Gel)    Collection Time: 08/29/24 10:44 PM   Result Value Ref Range    Extra Tube Hold for add-ons.    Lavender Top    Collection Time: 08/29/24 10:44 PM   Result Value Ref Range    Extra Tube hold for add-on    Gold Top - SST    Collection Time: 08/29/24 10:44 PM   Result Value Ref Range    Extra Tube Hold for add-ons.    Light Blue Top    Collection Time: 08/29/24 10:44 PM   Result Value Ref Range    Extra Tube Hold for add-ons.    CBC Auto Differential    Collection Time: 08/29/24 10:44 PM    Specimen: Blood   Result Value Ref Range    WBC 9.47 3.40 - 10.80 10*3/mm3    RBC 3.64 (L) 3.77 - 5.28 10*6/mm3    Hemoglobin 10.7 (L) 12.0 - 15.9 g/dL    Hematocrit 31.9 (L)  34.0 - 46.6 %    MCV 87.6 79.0 - 97.0 fL    MCH 29.4 26.6 - 33.0 pg    MCHC 33.5 31.5 - 35.7 g/dL    RDW 16.3 (H) 12.3 - 15.4 %    RDW-SD 51.6 37.0 - 54.0 fl    MPV 9.0 6.0 - 12.0 fL    Platelets 231 140 - 450 10*3/mm3    Neutrophil % 71.9 42.7 - 76.0 %    Lymphocyte % 22.2 19.6 - 45.3 %    Monocyte % 5.1 5.0 - 12.0 %    Eosinophil % 0.1 (L) 0.3 - 6.2 %    Basophil % 0.2 0.0 - 1.5 %    Immature Grans % 0.5 0.0 - 0.5 %    Neutrophils, Absolute 6.81 1.70 - 7.00 10*3/mm3    Lymphocytes, Absolute 2.10 0.70 - 3.10 10*3/mm3    Monocytes, Absolute 0.48 0.10 - 0.90 10*3/mm3    Eosinophils, Absolute 0.01 0.00 - 0.40 10*3/mm3    Basophils, Absolute 0.02 0.00 - 0.20 10*3/mm3    Immature Grans, Absolute 0.05 0.00 - 0.05 10*3/mm3    nRBC 0.0 0.0 - 0.2 /100 WBC   Magnesium    Collection Time: 08/29/24 10:44 PM    Specimen: Blood   Result Value Ref Range    Magnesium 2.2 1.6 - 2.6 mg/dL   Protime-INR    Collection Time: 08/29/24 10:44 PM    Specimen: Blood   Result Value Ref Range    Protime 12.4 11.7 - 14.2 Seconds    INR 0.91 0.90 - 1.10   aPTT    Collection Time: 08/29/24 10:44 PM    Specimen: Blood   Result Value Ref Range    PTT 25.3 22.7 - 35.4 seconds   TSH    Collection Time: 08/29/24 10:44 PM    Specimen: Blood   Result Value Ref Range    TSH 9.160 (H) 0.270 - 4.200 uIU/mL   T4, Free    Collection Time: 08/29/24 10:44 PM    Specimen: Blood   Result Value Ref Range    Free T4 1.30 0.92 - 1.68 ng/dL   Respiratory Panel PCR w/COVID-19(SARS-CoV-2) RAMÓN/SUKUMAR/ERICKA/PAD/COR/BRADEN In-House, NP Swab in UTM/VTM, 2 HR TAT - Swab, Nasopharynx    Collection Time: 08/29/24 11:04 PM    Specimen: Nasopharynx; Swab   Result Value Ref Range    ADENOVIRUS, PCR Not Detected Not Detected    Coronavirus 229E Not Detected Not Detected    Coronavirus HKU1 Not Detected Not Detected    Coronavirus NL63 Not Detected Not Detected    Coronavirus OC43 Not Detected Not Detected    COVID19 Not Detected Not Detected - Ref. Range    Human Metapneumovirus Not  Detected Not Detected    Human Rhinovirus/Enterovirus Not Detected Not Detected    Influenza A PCR Not Detected Not Detected    Influenza B PCR Not Detected Not Detected    Parainfluenza Virus 1 Not Detected Not Detected    Parainfluenza Virus 2 Not Detected Not Detected    Parainfluenza Virus 3 Not Detected Not Detected    Parainfluenza Virus 4 Not Detected Not Detected    RSV, PCR Not Detected Not Detected    Bordetella pertussis pcr Not Detected Not Detected    Bordetella parapertussis PCR Not Detected Not Detected    Chlamydophila pneumoniae PCR Not Detected Not Detected    Mycoplasma pneumo by PCR Not Detected Not Detected   High Sensitivity Troponin T 2Hr    Collection Time: 08/30/24 12:32 AM    Specimen: Blood   Result Value Ref Range    HS Troponin T 12 <14 ng/L    Troponin T Delta 2 >=-4 - <+4 ng/L         RADIOLOGY  CT Head Without Contrast    Result Date: 8/30/2024  Patient: CHERYL SINGH  Time Out: 00:03 Exam(s): CT HEAD Without Contrast EXAM:   CT Head Without Intravenous Contrast CLINICAL HISTORY:      Headache. TECHNIQUE:   Axial computed tomography images of the head brain without intravenous contrast.  CTDI is 56.23 mGy and DLP is 967.6 mGy-cm.  This CT exam was performed according to the principle of ALARA (As Low As Reasonably Achievable) by using one or more of the following dose reduction techniques: automated exposure control, adjustment of the mA and or kV according to patient size, and or use of iterative reconstruction technique. COMPARISON:   No relevant prior studies available. FINDINGS:   Brain:  No intracranial hemorrhage, mass-effect or midline shift.  No abnormal extra axial fluid.  No evidence of acute infarct.  Mild periventricular white matter hypodensities are most consistent with chronic microangiopathy.   Ventricles:  Unremarkable.  No ventriculomegaly.   Bones joints:  Unremarkable.  No acute fracture.   Soft tissues:  Unremarkable.   Sinuses:  Unremarkable as visualized.  No  acute sinusitis.   Mastoid air cells:  Unremarkable as visualized.  No mastoid effusion. IMPRESSION:       No acute intracranial finding.     Electronically signed by Veena Mosley MD on 08-30-24 at 0003    XR Chest 1 View    Result Date: 8/29/2024  Patient: CHERYL SINGH  Time Out: 23:49 Exam(s): XR CXR 1 VIEW EXAM:   XR Chest, 1 View CLINICAL HISTORY:      Chest Pain. TECHNIQUE:   Frontal view of the chest. COMPARISON:   Chest radiograph 2 26 2024 FINDINGS:   Lungs:  Unremarkable.  No consolidation.   Pleural space:  Unremarkable.  No pneumothorax.   Heart:  Unremarkable.  No cardiomegaly.   Mediastinum:  Unremarkable.  Normal mediastinal contour.   Bones joints:  There are degenerative changes of the spine.  No acute fracture. IMPRESSION:       No acute findings in the chest.     Electronically signed by Veena Mosley MD on 08-29-24 at 2349       MEDICATIONS GIVEN IN ER  Medications   acetaminophen (TYLENOL) tablet 1,000 mg (1,000 mg Oral Given 8/30/24 0019)           OUTPATIENT MEDICATION MANAGEMENT:  No current Epic-ordered facility-administered medications on file.     Current Outpatient Medications Ordered in Epic   Medication Sig Dispense Refill    albuterol sulfate  (90 Base) MCG/ACT inhaler Inhale 2 puffs Every 4 (Four) Hours As Needed for Wheezing. 8 g 0    amphetamine-dextroamphetamine (ADDERALL) 20 MG tablet Take 1 tablet by mouth Daily.      buPROPion XL (WELLBUTRIN XL) 150 MG 24 hr tablet Take 1 tablet by mouth Daily.      levothyroxine (SYNTHROID, LEVOTHROID) 300 MCG tablet Take 1 tablet by mouth Daily.      losartan (COZAAR) 100 MG tablet Take 1 tablet by mouth Daily.      ondansetron ODT (ZOFRAN-ODT) 4 MG disintegrating tablet Place 1 tablet on the tongue Every 6 (Six) Hours As Needed for Nausea or Vomiting. 20 tablet 0             PROGRESS, DATA ANALYSIS, CONSULTS, AND MEDICAL DECISION MAKING  ORDERS PLACED DURING THIS VISIT:  Orders Placed This Encounter   Procedures    Respiratory  Panel PCR w/COVID-19(SARS-CoV-2) RAMÓN/SUKUMAR/ERICKA/PAD/COR/BRADEN In-House, NP Swab in UTM/VTM, 2 HR TAT - Swab, Nasopharynx    XR Chest 1 View    CT Head Without Contrast    [place] Draw    Comprehensive Metabolic Panel    High Sensitivity Troponin T    CBC Auto Differential    Magnesium    Protime-INR    aPTT    TSH    T4, Free    High Sensitivity Troponin T 2Hr    Undress & Gown    Continuous Pulse Oximetry    ECG 12 Lead ED Triage Standing Order; Chest Pain    CBC & Differential    Green Top (Gel)    Lavender Top    Gold Top - SST    Light Blue Top       All labs have been independently interpreted by me.  All radiology studies have been reviewed by me. All EKG's have been independently viewed and interpreted by me.  Discussion below represents my analysis of pertinent findings related to patient's condition, differential diagnosis, treatment plan and final disposition.    Differential diagnosis includes but is not limited to:   My differential diagnosis for palpitations includes but is not limited to    Arrhythmias  Atrial fibrillation/flutter  Bradycardia caused by advanced arteriovenous  block or sinus node dysfunction  Bradycardia-tachycardia syndrome (sick sinus syndrome)  Multifocal atrial tachycardia  Premature supraventricular or ventricular contractions  Sinus tachycardia or arrhythmia  Supraventricular tachycardia  Ventricular tachycardia  Butch-Parkinson-White syndrome     Psychiatric causes  Anxiety disorder  Panic attacks  Drugs and medications  Alcohol  Caffeine  Certain prescription and over-the-counter agents (e.g., digitalis, phenothiazine, theophylline, beta agonists)  Street drugs (e.g., cocaine)  Tobacco    Nonarrhythmic cardiac causes  Atrial or ventricular septal defect  Cardiomyopathy  Congenital heart disease  Congestive heart failure  Mitral valve prolapse  Pacemaker-mediated tachycardia  Pericarditis  Valvular disease (e.g., aortic insufficiency, stenosis)    Extracardiac  causes  Anemia  Electrolyte imbalance  Fever  Hyperthyroidism  Hypoglycemia  Hypovolemia  Pheochromocytoma  Pulmonary disease  Vasovagal syndrome         ED Course:  ED Course as of 08/30/24 0340   Thu Aug 29, 2024   4163 I discussed the case with Dr. Luis and he agrees to evaluate the patient at the bedside.    [CC]   2334 TSH Baseline(!): 9.160 [CC]   2334 XR Chest 1 View  My independent interpretation of the chest x-ray is no acute infiltrate [CC]   2334 CT Head Without Contrast  My independent interpretation of the CT of the head is no intracranial hemorrhage [CC]   2334 Free T4: 1.30 [CC]   Fri Aug 30, 2024   0106 Troponin T Delta: 2 [CC]   0108 I rechecked the patient.  I discussed the patient's labs, radiology findings (including all incidental findings), diagnosis, and plan for discharge.  A repeat exam reveals no new worrisome changes from my initial exam findings.  The patient understands that the fact that they are being discharged does not denote that nothing is abnormal, it indicates that no clinical emergency is present and that they must follow-up as directed in order to properly maintain their health.  Follow-up instructions (specifically listed below) and return to ER precautions were given at this time.  I specifically instructed the patient to follow-up with their PCP.  The patient understands and agrees with the plan, and is ready for discharge.  All questions answered.   [CC]      ED Course User Index  [CC] Roberta Zelaya PA-C           AS OF 03:40 EDT VITALS:    BP - 148/87  HR - 88  TEMP - 97.1 °F (36.2 °C) (Tympanic)  O2 SATS - 97%      MDM:  Patient is a 59-year-old female who presents emergency department today with headache, palpitations, and generally feeling unwell for the last 2 days.  On arrival here in the emergency department vitals are reassuring, she is afebrile.  On my exam the patient appears anxious but is otherwise well-appearing.  Patient was evaluated with labs which  are overall reassuring.  TSH and free T4 have improved from 1 week ago.  Her troponin is negative x 2 and EKG is nonischemic and shows no concerning arrhythmia.  CT of her head shows no abnormalities.  Her blood pressure improved while here in the emergency department and her symptoms completely resolved.  Suspect her symptoms may be related to recent increase in her thyroid medication.  We discussed admission to the hospital versus discharge home and patient would prefer to go home and follow-up outpatient with cardiology.  Strict return precautions were given.  She is appropriate for discharge with outpatient follow-up.      COMPLEXITY OF CARE  Admission was considered but after careful review of the patient's presentation, physical examination, diagnostic results, and response to treatment the patient may be safely discharged with outpatient follow-up.        DIAGNOSIS  Final diagnoses:   Acute nonintractable headache, unspecified headache type   Palpitations   Hypertension not at goal         DISPOSITION  ED Disposition       ED Disposition   Discharge    Condition   Stable    Comment   --                    Please note that portions of this document were completed with a voice recognition program.    Note Disclaimer: At [facility], we believe that sharing information builds trust and better relationships. You are receiving this note because you recently visited [facility]. It is possible you will see health information before a provider has talked with you about it. This kind of information can be easy to misunderstand. To help you fully understand what it means for your health, we urge you to discuss this note with your provider.     Roberta Zelaya PA-C  08/30/24 0359

## 2024-08-30 NOTE — ED TRIAGE NOTES
Patient ambulatory reporting high blood pressure x a few days. Today patient began having headache, nausea, chest palpitations and chest pressure. Patient reports taking blood pressure medications as prescribed.

## 2024-08-30 NOTE — DISCHARGE INSTRUCTIONS
Please follow-up with your PCP and Cardiology    Although you are being discharged in the ED today, I encouraged her to return for worsening symptoms.  Things can, and do, change such a treatment at home with medication may not be adequate.  Specifically I recommend returning for chest pain or discomfort, difficulty breathing, persistent vomiting or difficulty holding down liquids or medications, fever > 102.0 F,  or any other worsening or alarming symptoms.       You have been evaluated in the emergency department for your presenting symptoms and deemed appropriate for discharge home.  Understand that your health care does not end here today.  It is important that your primary care physician be made aware of your visit.  I recommend calling your primary care provider in the next 48 hours to arrange follow-up care.  Your primary care provider needs to review your treatment and recovery to ensure that no further treatment is necessary.  Additional testing or procedures may be necessary as an outpatient at the discretion of your primary care provider.    I also recommend following up with your PCP for recheck of your blood pressure and treatment as needed.

## 2025-05-15 ENCOUNTER — HOSPITAL ENCOUNTER (EMERGENCY)
Facility: HOSPITAL | Age: 60
Discharge: HOME | End: 2025-05-16
Attending: EMERGENCY MEDICINE
Payer: COMMERCIAL

## 2025-05-15 VITALS
RESPIRATION RATE: 18 BRPM | SYSTOLIC BLOOD PRESSURE: 154 MMHG | BODY MASS INDEX: 27.47 KG/M2 | DIASTOLIC BLOOD PRESSURE: 101 MMHG | OXYGEN SATURATION: 98 % | WEIGHT: 175 LBS | HEIGHT: 67 IN | TEMPERATURE: 97.5 F | HEART RATE: 90 BPM

## 2025-05-15 DIAGNOSIS — M25.511 ACUTE PAIN OF RIGHT SHOULDER: Primary | ICD-10-CM

## 2025-05-15 PROCEDURE — 99283 EMERGENCY DEPT VISIT LOW MDM: CPT

## 2025-05-15 NOTE — Clinical Note
[facility]  [id number]  [place] [id number]    Rachael Billings was seen and treated in our emergency department on 5/15/2025.  She may return to work on 05/19/2025.         Thank you for choosing [facility].    Chato Johnson MD

## 2025-05-16 ENCOUNTER — APPOINTMENT (OUTPATIENT)
Dept: GENERAL RADIOLOGY | Facility: HOSPITAL | Age: 60
End: 2025-05-16
Payer: COMMERCIAL

## 2025-05-16 PROCEDURE — 25010000002 KETOROLAC TROMETHAMINE PER 15 MG: Performed by: EMERGENCY MEDICINE

## 2025-05-16 PROCEDURE — 96372 THER/PROPH/DIAG INJ SC/IM: CPT

## 2025-05-16 PROCEDURE — 73030 X-RAY EXAM OF SHOULDER: CPT

## 2025-05-16 PROCEDURE — 99283 EMERGENCY DEPT VISIT LOW MDM: CPT | Performed by: EMERGENCY MEDICINE

## 2025-05-16 RX ORDER — KETOROLAC TROMETHAMINE 15 MG/ML
15 INJECTION, SOLUTION INTRAMUSCULAR; INTRAVENOUS ONCE
Status: COMPLETED | OUTPATIENT
Start: 2025-05-16 | End: 2025-05-16

## 2025-05-16 RX ORDER — METHOCARBAMOL 750 MG/1
750 TABLET, FILM COATED ORAL 3 TIMES DAILY PRN
Qty: 30 TABLET | Refills: 0 | Status: SHIPPED | OUTPATIENT
Start: 2025-05-16

## 2025-05-16 RX ADMIN — KETOROLAC TROMETHAMINE 15 MG: 15 INJECTION, SOLUTION INTRAMUSCULAR; INTRAVENOUS at 01:25

## 2025-05-16 NOTE — FSED PROVIDER NOTE
"    [facility]  eMERGENCY dEPARTMENT eNCOUnter      Pt Name: Rachael Billings  MRN: [id number]  Birthdate 1965  Date of evaluation: 5/16/2025  Provider: Chato Johnson MD    CHIEF COMPLAINT       Chief Complaint   Patient presents with    Arm Pain       Nurses Notes reviewed and I agree except as noted in the HPI.      HISTORY OF PRESENT ILLNESS       History provided by:  Patient   used: Narcisa          Rachael Billings is a 59 y.o. female who presents to the emergency department for a injury to her right shoulder.  Patient states that she was pulling on the lawnmower cord when she felt a \"pop\" in the right shoulder.  She has already been evaluated by her orthopedic surgeon for an injury to the shoulder prior.  She is receiving steroid injections in that shoulder and just recently got 1 just a couple days ago.  Following the \"pop\" in her shoulder, patient has been limited in her range of motion.  She is not able to put her hand over her head.  She cannot abduct at the shoulder.  Internal and external rotation are intact.  She cannot reach behind her.      REVIEW OF SYSTEMS       Review of Systems   Musculoskeletal:  Positive for arthralgias (Right shoulder). Negative for joint swelling.         PAST MEDICAL HISTORY     Past Medical History:   Diagnosis Date    Depression     Disease of thyroid gland     Hypertension          SURGICAL HISTORY        has a past surgical history that includes Hysterectomy and Gastrectomy.      CURRENT MEDICATIONS          Medication List        START taking these medications      methocarbamol 750 MG tablet  Commonly known as: ROBAXIN  Take 1 tablet by mouth 3 (Three) Times a Day As Needed for Muscle Spasms.            CONTINUE taking these medications      albuterol sulfate  (90 Base) MCG/ACT inhaler  Commonly known as: PROVENTIL HFA;VENTOLIN HFA;PROAIR HFA  Inhale 2 puffs Every 4 (Four) Hours As Needed for Wheezing.   " "  amoxicillin-clavulanate 875-125 MG per tablet  Commonly known as: AUGMENTIN  Take 1 tablet by mouth Every 12 (Twelve) Hours.     amphetamine-dextroamphetamine 20 MG tablet  Commonly known as: ADDERALL     buPROPion  MG 24 hr tablet  Commonly known as: WELLBUTRIN XL     levothyroxine 300 MCG tablet  Commonly known as: SYNTHROID, LEVOTHROID     losartan 100 MG tablet  Commonly known as: COZAAR     naproxen 500 MG EC tablet  Commonly known as: EC NAPROSYN  Take 1 tablet by mouth 2 (Two) Times a Day As Needed for Mild Pain.     ondansetron ODT 4 MG disintegrating tablet  Commonly known as: ZOFRAN-ODT  Place 1 tablet on the tongue Every 6 (Six) Hours As Needed for Nausea or Vomiting.               Where to Get Your Medications        These medications were sent to [facility] [id number] - [place], [place] - [address] AT [facility] & [address] - [phone]  - [phone]   [address]  [address], [place] [address]      Phone: [phone]   methocarbamol 750 MG tablet           ALLERGIES       has no known allergies.      FAMILY HISTORY       has no family status information on file.    family history is not on file.    SOCIAL HISTORY        reports that she has been smoking cigarettes. She has never used smokeless tobacco. She reports that she does not currently use alcohol. She reports that she does not use drugs.    PHYSICAL EXAM       INITIAL VITALS:  height is 168.9 cm (66.5\") and weight is 79.4 kg (175 lb). Her temporal temperature is 97.5 °F (36.4 °C). Her blood pressure is 154/101 (abnormal) and her pulse is 90. Her respiration is 18 and oxygen saturation is 98%.      Physical Exam  Vitals and nursing note reviewed.   Constitutional:       Appearance: Normal appearance. She is not ill-appearing.   HENT:      Head: Normocephalic and atraumatic.   Eyes:      Extraocular Movements: Extraocular movements intact.      Pupils: Pupils are equal, round, and reactive to light. " "  Musculoskeletal:         General: Tenderness and signs of injury present.      Right shoulder: Tenderness present. No swelling or bony tenderness. Decreased range of motion.      Comments: Select of the shoulder is limited to about 90 degrees.  She cannot go much further than that and unable to put it above her head.  Patient can only abduct to about 90 degrees also.  Passively I cannot get her past the 90 degree nisreen.  Extension of the arm at the shoulder is limited secondary to pain as well.  Abduction appears to be intact.  Internal and external rotation at the shoulder is limited secondary to pain.  Jaden's test appears to be positive   Skin:     General: Skin is warm and dry.   Neurological:      General: No focal deficit present.      Mental Status: She is alert and oriented to person, place, and time.      Cranial Nerves: No cranial nerve deficit.   Psychiatric:         Mood and Affect: Mood normal.           DIAGNOSTIC RESULTS     EKG: All EKG's are interpreted by the Emergency Department Physician who either signs or Co-signs this chart in the absence of a cardiologist.        RADIOLOGY: non-plain film images(s) such as CT, Ultrasound and MRI are read by the radiologist.  Plain radiographic images are visualized and preliminarily interpreted by the emergency physician unless otherwise stated below.      XR Shoulder 2+ View Right   Final Result   No acute fracture or subluxation identified.       This report was finalized on 5/16/2025 1:10 AM by Dr. Palak Vicente M.D on Workstation: [id number]                   LABS:   Lab Results (last 24 hours)       ** No results found for the last 24 hours. **            EMERGENCY DEPARTMENT COURSE:   Vitals:    Vitals:    05/15/25 2223 05/15/25 2241   BP:  (!) 154/101   Pulse: 90    Resp:  18   Temp: 97.5 °F (36.4 °C)    TempSrc: Temporal    SpO2: 98%    Weight: 79.4 kg (175 lb)    Height: 168.9 cm (66.5\")        Medical Decision Making  Patient presented to " the ED complaining of right shoulder pain.  She has a prior injury.  She appears to have reinjured it while pulling on the starter cord of a mower this evening.  Shoulder range of motion test are all limited secondary to pain.  As a possibility she has a labral tear or even a rotator cuff tear.  Patient told me she has a partial rotator cuff injury already.  In the short-term, I am going to place her in a sling and put her on some muscle relaxers.  She already sees a orthopedist who has been giving her injections.  I walked her through some simple range of motion exercises she can perform so the shoulder does not freeze.  I explained to her that unfortunately I cannot tell her the exact cause of the injury tonight.  Images do not show a fracture.  I explained she may need some physical therapy or even an MRI to further quantify what kind of injury there is.  After careful review of history, physical and supporting data, there is low suspicion for a life or limb threatening cause of patient's symptoms.  The patient's symptoms have improved from presentation and they appear clinically well.  Patient reexamined prior to discharge and they appear improved.  Patient has been encouraged to follow-up with his/her PCP and to return to the ED with any lack of improvement or worsening symptoms.  The patient's questions regarding the work-up and plan were invited and answered.  The patient states understanding and agreement with discharge planning.      Problems Addressed:  Acute pain of right shoulder: complicated acute illness or injury    Amount and/or Complexity of Data Reviewed  Radiology: ordered.    Risk  Prescription drug management.         The patient was given the following medications:  Medications   ketorolac (TORADOL) injection 15 mg (15 mg Intramuscular Given 5/16/25 0125)            CRITICAL CARE:  none    CONSULTS:  none    PROCEDURES:  Procedures  None    DIFFERENTIAL DIAGNOSIS:     Differential diagnoses  include, but not limited to labral tear, shoulder dislocation, rotator cuff tear, shoulder bursitis, shoulder impingement, tendinitis      FINAL IMPRESSION      1. Acute pain of right shoulder          DISPOSITION/PLAN     PATIENT REFERRED TO:  Feli Cottrell MD  [address]  [address]  [place] [address]  [phone]    Call in 3 days  As needed      DISCHARGE MEDICATIONS:     Medication List        START taking these medications      methocarbamol 750 MG tablet  Commonly known as: ROBAXIN  Take 1 tablet by mouth 3 (Three) Times a Day As Needed for Muscle Spasms.            CONTINUE taking these medications      albuterol sulfate  (90 Base) MCG/ACT inhaler  Commonly known as: PROVENTIL HFA;VENTOLIN HFA;PROAIR HFA  Inhale 2 puffs Every 4 (Four) Hours As Needed for Wheezing.     amoxicillin-clavulanate 875-125 MG per tablet  Commonly known as: AUGMENTIN  Take 1 tablet by mouth Every 12 (Twelve) Hours.     amphetamine-dextroamphetamine 20 MG tablet  Commonly known as: ADDERALL     buPROPion  MG 24 hr tablet  Commonly known as: WELLBUTRIN XL     levothyroxine 300 MCG tablet  Commonly known as: SYNTHROID, LEVOTHROID     losartan 100 MG tablet  Commonly known as: COZAAR     naproxen 500 MG EC tablet  Commonly known as: EC NAPROSYN  Take 1 tablet by mouth 2 (Two) Times a Day As Needed for Mild Pain.     ondansetron ODT 4 MG disintegrating tablet  Commonly known as: ZOFRAN-ODT  Place 1 tablet on the tongue Every 6 (Six) Hours As Needed for Nausea or Vomiting.               Where to Get Your Medications        These medications were sent to [facility] [id number] - [place], [place] - [address] AT [facility]. [address] - [phone] [facility] [phone] FX  [address], [facility] [address]      Phone: [phone]   methocarbamol 750 MG tablet         (Please note that portions of this note were completed with a voice recognition program.  Efforts were made to edit the  dictations but occasionally words are mis-transcribed.)    Chato Johnson MD (electronically signed) Emergency Physician